# Patient Record
Sex: MALE | URBAN - METROPOLITAN AREA
[De-identification: names, ages, dates, MRNs, and addresses within clinical notes are randomized per-mention and may not be internally consistent; named-entity substitution may affect disease eponyms.]

---

## 2020-07-13 ENCOUNTER — DOCUMENTATION (OUTPATIENT)
Dept: GASTROENTEROLOGY | Facility: CLINIC | Age: 60
End: 2020-07-13

## 2020-07-13 NOTE — LETTER
7/13/2020    Dear Pedro Chavez,    Review of our records shows you are due for the following:    Colonoscopy    Please call the following office to schedule your appointment:    [facility]    We look forward to hearing from you      Sincerely,    Dr Emily Sanchez